# Patient Record
Sex: FEMALE | Race: WHITE | URBAN - METROPOLITAN AREA
[De-identification: names, ages, dates, MRNs, and addresses within clinical notes are randomized per-mention and may not be internally consistent; named-entity substitution may affect disease eponyms.]

---

## 2017-04-25 ENCOUNTER — OFFICE VISIT (OUTPATIENT)
Dept: OPHTHALMOLOGY | Facility: CLINIC | Age: 72
End: 2017-04-25

## 2017-04-25 DIAGNOSIS — H25.10 SENILE NUCLEAR SCLEROSIS, UNSPECIFIED LATERALITY: Primary | ICD-10-CM

## 2017-04-25 DIAGNOSIS — H40.003 GLAUCOMA SUSPECT OF BOTH EYES: ICD-10-CM

## 2017-04-25 ASSESSMENT — EXTERNAL EXAM - RIGHT EYE: OD_EXAM: NORMAL

## 2017-04-25 ASSESSMENT — VISUAL ACUITY
CORRECTION_TYPE: GLASSES
OD_CC+: -2
METHOD: SNELLEN - LINEAR
OS_CC: 20/20
OS_CC+: -2
OD_CC: 20/20

## 2017-04-25 ASSESSMENT — REFRACTION_WEARINGRX
OD_CYLINDER: +0.25
OD_AXIS: 175
OS_SPHERE: +0.75
OD_ADD: +2.75
OS_ADD: +2.75
OS_CYLINDER: SPHERE
OD_SPHERE: +0.75
SPECS_TYPE: PAL

## 2017-04-25 ASSESSMENT — PACHYMETRY
OS_CT(UM): 515
OD_CT(UM): 511

## 2017-04-25 ASSESSMENT — CONF VISUAL FIELD
OD_NORMAL: 1
OS_NORMAL: 1

## 2017-04-25 ASSESSMENT — TONOMETRY
IOP_METHOD: APPLANATION
OD_IOP_MMHG: 12
OS_IOP_MMHG: 12

## 2017-04-25 ASSESSMENT — REFRACTION_MANIFEST
OS_ADD: +2.75
OD_SPHERE: +0.75
OD_AXIS: 175
OD_CYLINDER: +0.25
OS_SPHERE: +0.75
OD_ADD: +2.75
OS_CYLINDER: SPHERE

## 2017-04-25 ASSESSMENT — EXTERNAL EXAM - LEFT EYE: OS_EXAM: NORMAL

## 2017-04-25 ASSESSMENT — CUP TO DISC RATIO
OD_RATIO: 0.65
OS_RATIO: 0.65

## 2017-04-25 ASSESSMENT — SLIT LAMP EXAM - LIDS
COMMENTS: UPPER LID DERMATOCHALASIS
COMMENTS: UPPER LID DERMATOCHALASIS

## 2017-04-25 NOTE — MR AVS SNAPSHOT
After Visit Summary   2017    Ludmila Carl    MRN: [id number]           Patient Information     Date Of Birth          1945        Visit Information        Provider Department      2017 9:00 AM Phil Martins MD [facility]        Today's Diagnoses     Senile nuclear sclerosis, unspecified laterality - Both Eyes    -  1    Glaucoma suspect of both eyes           Follow-ups after your visit        Follow-up notes from your care team     Return in about 1 year (around 2018) for Complete Eye Exam, Glaucoma Suspect.      Who to contact     Please call your clinic at [phone] to:    Ask questions about your health    Make or cancel appointments    Discuss your medicines    Learn about your test results    Speak to your doctor   If you have compliments or concerns about an experience at your clinic, or if you wish to file a complaint, please contact [facility] Patient Relations at [phone] or email us at [email or web address]         Additional Information About Your Visit        MyChart Information     [employer] is an electronic gateway that provides easy, online access to your medical records. With [employer], you can request a clinic appointment, read your test results, renew a prescription or communicate with your care team.     To sign up for [employer] visit the website at [email or web address]   You will be asked to enter the access code listed below, as well as some personal information. Please follow the directions to create your username and password.     Your access code is: [email or web address]  Expires: 2017 10:36 AM     Your access code will  in 90 days. If you need help or a new code, please contact your [facility] or call [phone] for assistance.        Care EveryWhere ID     This is your Care EveryWhere ID. This could be used by other organizations to  access your [place] medical records  [id number]         Blood Pressure from Last 3 Encounters:   No data found for BP    Weight from Last 3 Encounters:   No data found for Wt              We Performed the Following     OCT Optic Nerve RNFL Spectralis OU (both eyes)     OVF 24-2 Dynamic OU        Primary Care Provider Office Phone # Fax #    Katherine Acosta [phone]       [facility] [address] [place] [address]        Thank you!     Thank you for choosing [facility]  for your care. Our goal is always to provide you with excellent care. Hearing back from our patients is one way we can continue to improve our services. Please take a few minutes to complete the written survey that you may receive in the mail after your visit with us. Thank you!             Your Updated Medication List - Protect others around you: Learn how to safely use, store and throw away your medicines at www.disposemymeds.org.          This list is accurate as of: 4/25/17 10:36 AM.  Always use your most recent med list.                   Brand Name Dispense Instructions for use    CALCIUM PO      Take 1 tablet by mouth daily

## 2017-04-25 NOTE — NURSING NOTE
Chief Complaints and History of Present Illnesses   Patient presents with     COMPREHENSIVE EYE EXAM     Hx: glaucoma suspect     HPI    Affected eye(s):  Both   Symptoms:     No blurred vision   No foreign body sensation      Duration:  6 months   Frequency:  Constant       Do you have eye pain now?:  No      Comments:  Complete exam.  No concerns.  No eye meds.  Sarah HUERTA 9:26 AM 04/25/2017

## 2017-04-26 NOTE — PROGRESS NOTES
Assessment & Plan      Ludmila Carl is a 71 year old female with the following diagnoses:   (H25.10) Senile nuclear sclerosis, unspecified laterality - Both Eyes  (primary encounter diagnosis)  Comment: Mild  Plan: Follow    (H40.003) Glaucoma suspect of both eyes  Comment: No clear evidence of glaucoma  Plan: OCT Optic Nerve RNFL Spectralis OU (both eyes),        OVF 24-2 Dynamic OU             -----------------------------------------------------------------------------------      Patient disposition:   Return in about 1 year (around 4/25/2018) for Complete Eye Exam, Glaucoma Suspect. or sooner as needed.    Complete documentation of historical and exam elements from today's encounter can  be found in the full encounter summary report (not reduplicated in this progress  note). I personally obtained the chief complaint(s) and history of present illness. I  confirmed and edited as necessary the review of systems, past medical/surgical  history, family history, social history, and examination findings as documented by  others; and I examined the patient myself. I personally reviewed the relevant tests,  images, and reports as documented above. I formulated and edited as necessary the  assessment and plan and discussed the findings and management plan with the  patient and family.    STUART Martins M.D

## 2018-10-02 ENCOUNTER — OFFICE VISIT (OUTPATIENT)
Dept: OPHTHALMOLOGY | Facility: CLINIC | Age: 73
End: 2018-10-02
Payer: COMMERCIAL

## 2018-10-02 DIAGNOSIS — H25.13 NUCLEAR SCLEROTIC CATARACT OF BOTH EYES: Primary | ICD-10-CM

## 2018-10-02 DIAGNOSIS — H40.003 GLAUCOMA SUSPECT, BOTH EYES: ICD-10-CM

## 2018-10-02 DIAGNOSIS — H52.4 PRESBYOPIA: ICD-10-CM

## 2018-10-02 DIAGNOSIS — H52.203 HYPEROPIC ASTIGMATISM OF BOTH EYES: ICD-10-CM

## 2018-10-02 DIAGNOSIS — L98.9 LESION OF EYEBROW: ICD-10-CM

## 2018-10-02 RX ORDER — CALCIUM CARBONATE 500 MG/1
1 TABLET, CHEWABLE ORAL 2 TIMES DAILY
COMMUNITY

## 2018-10-02 ASSESSMENT — REFRACTION_WEARINGRX
OD_CYLINDER: +0.25
OS_CYLINDER: SPHERE
OD_SPHERE: +0.50
OS_ADD: +2.75
OD_AXIS: 175
OD_ADD: +2.75
OS_SPHERE: +0.75
SPECS_TYPE: PAL

## 2018-10-02 ASSESSMENT — CUP TO DISC RATIO
OS_RATIO: 0.6
OD_RATIO: 0.6

## 2018-10-02 ASSESSMENT — REFRACTION_MANIFEST
OS_AXIS: 150
OD_SPHERE: PLANO
OS_ADD: +2.75
OD_ADD: +2.75
OS_SPHERE: +1.00
OD_CYLINDER: SPHERE
OS_CYLINDER: +0.25

## 2018-10-02 ASSESSMENT — VISUAL ACUITY
METHOD: SNELLEN - LINEAR
CORRECTION_TYPE: GLASSES
OD_CC+: +2
OD_CC: 20/40
OS_CC: 20/30

## 2018-10-02 ASSESSMENT — TONOMETRY
OS_IOP_MMHG: 10
OD_IOP_MMHG: 10
IOP_METHOD: ICARE

## 2018-10-02 ASSESSMENT — SLIT LAMP EXAM - LIDS
COMMENTS: UPPER LID DERMATOCHALASIS
COMMENTS: UPPER LID DERMATOCHALASIS

## 2018-10-02 ASSESSMENT — EXTERNAL EXAM - RIGHT EYE: OD_EXAM: NORMAL

## 2018-10-02 ASSESSMENT — CONF VISUAL FIELD
OS_NORMAL: 1
OD_NORMAL: 1

## 2018-10-02 NOTE — MR AVS SNAPSHOT
After Visit Summary   10/2/2018    Ludmila Carl    MRN: [id number]           Patient Information     Date Of Birth          1945        Visit Information        Provider Department      10/2/2018 2:15 PM Beth Cowart MD [facility]        Today's Diagnoses     Nuclear sclerotic cataract of both eyes    -  1    Glaucoma suspect, both eyes        Lesion of eyebrow        Hyperopic astigmatism of both eyes        Presbyopia           Follow-ups after your visit        Follow-up notes from your care team     Return in about 1 year (around 10/2/2019).      Who to contact     Please call your clinic at [phone] to:    Ask questions about your health    Make or cancel appointments    Discuss your medicines    Learn about your test results    Speak to your doctor            Additional Information About Your Visit        MyChart Information     [employer] is an electronic gateway that provides easy, online access to your medical records. With [employer], you can request a clinic appointment, read your test results, renew a prescription or communicate with your care team.     To sign up for [employer] visit the website at [email or web address]   You will be asked to enter the access code listed below, as well as some personal information. Please follow the directions to create your username and password.     Your access code is: [email or web address]  Expires: 2018  6:31 AM     Your access code will  in 90 days. If you need help or a new code, please contact your [facility] or call [phone] for assistance.        Care EveryWhere ID     This is your Care EveryWhere ID. This could be used by other organizations to access your [place] medical records  [id number]         Blood Pressure from Last 3 Encounters:   No data found for BP    Weight from Last 3 Encounters:   No data found for Wt              Today, you had the following     No  orders found for display         Today's Medication Changes          These changes are accurate as of 10/2/18  3:15 PM.  If you have any questions, ask your nurse or doctor.               Stop taking these medicines if you haven't already. Please contact your care team if you have questions.     CALCIUM PO   Stopped by:  Beth Cowart MD                    Primary Care Provider Office Phone # Fax #    Katherine GUILHERME Acosta [phone]       [facility] [address] [place] [address]        Equal Access to Services     [facility]: Hadii aad ku hadasho Soomaali, waaxda luqadaha, qaybta kaalmada adeegyada, waxtony paredes . So [facility] [phone].    ATENCIÓN: Si habla español, tiene a gupta disposición servicios gratuitos de asistencia lingüística. [facility] [phone].    We comply with applicable federal civil rights laws and [place] laws. We do not discriminate on the basis of race, color, national origin, age, disability, sex, sexual orientation, or gender identity.            Thank you!     Thank you for choosing [facility]  for your care. Our goal is always to provide you with excellent care. Hearing back from our patients is one way we can continue to improve our services. Please take a few minutes to complete the written survey that you may receive in the mail after your visit with us. Thank you!             Your Updated Medication List - Protect others around you: Learn how to safely use, store and throw away your medicines at www.disposemymeds.org.          This list is accurate as of 10/2/18  3:15 PM.  Always use your most recent med list.                   Brand Name Dispense Instructions for use Diagnosis    calcium carbonate 500 MG chewable tablet    TUMS     Take 1 chew tab by mouth 2 times daily

## 2018-10-02 NOTE — PROGRESS NOTES
HPI  Ludmila Carl is a 73 year old female here for full eye exam. She was previously followed by Dr. Martins. She has noticed some mild blurring left eye more so than right eye over the last year. Her current glasses don't make the vision as clear as the used to. She also has noticed a bump over her medial left eye brow. It has not changed very much over the last year. Her PCP took a look at it and was not concerned - thought it might be a fatty bump. She had a CT scan done of her head/face for other reasons, and it did not show anything concerning in the area of the mass. No pain, redness, discharge. She gets some tearing left eye which is unchanged. No flashes/floaters.      Assessment & Plan    (H25.13) Nuclear sclerotic cataract of both eyes  (primary encounter diagnosis)  Comment: Early visual significance with BCVA of 20/25 and 20/30+.  Plan: Observe for now    (H40.003) Glaucoma suspect, both eyes  Comment: Previously evaluated by Dr. Martins with nerve OCT in 2016 and 2017 that showed stable, healthy RNFL thickness. Likely physiologic cupping.  Plan: Observe, no additional testing unless something were to change.    (L98.9) Lesion of eyebrow  Comment: Subcutaneous. Not changing, per patient, no worrisome findings on prior CT scan.  Plan: Monitor. If grows or becomes bothersome, recommend biopsy with oculoplastics.     (H52.203) Hyperopic astigmatism of both eyes  (H52.4) Presbyopia  Comment: Improved vision with refraction, mildly limited by cataract  Plan: Given updated glasses Rx.       -----------------------------------------------------------------------------------    Patient disposition:   Return in about 1 year (around 10/2/2019). or sooner as needed.    Teaching statement:  Complete documentation of historical and exam elements from today's encounter can be found in the full encounter summary report (not reduplicated in this progress note). I personally obtained the chief complaint(s) and history of  present illness.  I confirmed and edited as necessary the review of systems, past medical/surgical history, family history, social history, and examination findings as documented by others; and I examined the patient myself. I personally reviewed the relevant tests, images, and reports as documented above.     I formulated and edited as necessary the assessment and plan and discussed the findings and management plan with the patient and family.    Beth Cowart MD  Comprehensive Ophthalmology & Ocular Pathology  Department of Ophthalmology and Visual Neurosciences  [email or web address]  Pager [phone]